# Patient Record
Sex: FEMALE | Employment: OTHER | URBAN - METROPOLITAN AREA
[De-identification: names, ages, dates, MRNs, and addresses within clinical notes are randomized per-mention and may not be internally consistent; named-entity substitution may affect disease eponyms.]

---

## 2017-08-22 ENCOUNTER — TRANSFERRED RECORDS (OUTPATIENT)
Dept: HEALTH INFORMATION MANAGEMENT | Facility: CLINIC | Age: 62
End: 2017-08-22

## 2024-06-06 ENCOUNTER — TRANSFERRED RECORDS (OUTPATIENT)
Dept: HEALTH INFORMATION MANAGEMENT | Facility: CLINIC | Age: 69
End: 2024-06-06

## 2024-08-15 ENCOUNTER — TRANSCRIBE ORDERS (OUTPATIENT)
Dept: OTHER | Age: 69
End: 2024-08-15

## 2024-08-15 DIAGNOSIS — R27.0 ATAXIA: Primary | ICD-10-CM

## 2024-10-16 ENCOUNTER — TRANSFERRED RECORDS (OUTPATIENT)
Dept: HEALTH INFORMATION MANAGEMENT | Facility: CLINIC | Age: 69
End: 2024-10-16

## 2024-10-23 NOTE — TELEPHONE ENCOUNTER
Action 10/23/24 MV 4.37pm   Action Taken Records and imaging request faxed to [facility]     Action 10/28/24 MV 11.01am   Action Taken 1) Recs received and sent to scanning   2) called [facility] to push over remaining CT images     UPDATE: images resolved         RECORDS RECEIVED FROM: external   REASON FOR VISIT: ataxia   PROVIDER: Dr. Hill   DATE OF APPT: 11/1/24   NOTES (FOR ALL VISITS) STATUS DETAILS   OFFICE NOTE from referring provider Received Dr Morris Fields @ [facility]:  6/6/24 9/18/17   MEDICATION LIST Received    IMAGING  (FOR ALL VISITS)     MRI (HEAD, NECK, SPINE) PACS [facility]:  MRI Head 7/9/24   CT (HEAD, NECK, SPINE) PACS [facility]:  CT Head 8/13/24  CT Head 7/3/24  CT Head 5/5/24  CT Head 4/30/24

## 2024-10-31 NOTE — PROGRESS NOTES
Department of Neurology  Movement Disorders Division  New Patient     Patient: Michelle Saldaña  MRN: [id number]   : 1955   Date of Visit: 10/31/2024  PCP: [facility]  Referring Provider:    CC: Gait Abnormality w/ recurrent falls + Tremor    History of Present Illness  Michelle Saldaña is a 69 year old ***-handed female with pertinent PMHx of carotid artery stenosis, HTN, hypothyroidism, sleep apnea, IBS, depression, bipolar affective disorder, and hx rheumatic fever that presents to Neurology Movement clinic with complaints of an abnormal gait, recurrent falls, and tremor. They are accompanied by ***    ***    Movement Disorder-related Medications                                                          Last taken at ***    Review of Systems:  Other than that mentioned above, the remainder of 12 systems reviewed were negative.    Past Medical History:   No past medical history on file.    Past Surgical History:   No past surgical history on file.    Family History:   No family history on file.    Social History:        Medications:  No current outpatient medications on file.     Allergies:   Not on File     Physical Exam:  There were no vitals taken for this visit.    General: Sitting comfortably in chair, NAD  Neuro:  Mental Status: Awake, alert, attentive, and oriented. Able to provide a detailed history and answer questions without issue. Speech is clear and fluid  Cranial Nerves: PERRL, conjugate gaze, EOMI w/o nystagmus, visual fields intact bilaterally, facial sensation intact, no facial asymmetry noted, hearing intact to conversation, no dysarthria, symmetric palate rise w/ midline uvula, tongue is midline, shoulder shrug 5/5 bilaterally  Motor: Normal bulk and tone. No abnormal movements. Strength is 5/5 in all extremities in both proximal and distal muscle groups  Sensory: Intact to light touch in all extremities. Neg Romberg  Coordination: FNF and HS intact  bilaterally  Reflexes: 2+ and symmetric in the bilateral biceps, brachioradialis, knees, and ankles. Down-going toes bilaterally, no clonus  Gait: Stance is narrow. Gait is smooth and balanced. No issue with turns    Data Reviewed: I have personally reviewed the tests/studies below.   MRI Brain w/o contrast (7/9/2024)  IMPRESSION:  No acute intracranial abnormality. No evidence of acute infarct, hemorrhage, or mass    Impression:    Michelle DomínguezWatson is a 69 year old female who presents with ***    Plan:   ***    Patient was discussed with the Movement Disorder attending, Dr Hill, who agrees with the above plan    Sandy Cabrera MD  Movement Disorder Fellow

## 2024-11-01 ENCOUNTER — PRE VISIT (OUTPATIENT)
Dept: NEUROLOGY | Facility: CLINIC | Age: 69
End: 2024-11-01

## 2024-11-01 ENCOUNTER — OFFICE VISIT (OUTPATIENT)
Dept: NEUROLOGY | Facility: CLINIC | Age: 69
End: 2024-11-01
Attending: PSYCHIATRY & NEUROLOGY
Payer: COMMERCIAL

## 2024-11-01 VITALS
OXYGEN SATURATION: 96 % | SYSTOLIC BLOOD PRESSURE: 162 MMHG | DIASTOLIC BLOOD PRESSURE: 92 MMHG | WEIGHT: 167 LBS | HEART RATE: 74 BPM

## 2024-11-01 DIAGNOSIS — M62.838 MUSCLE SPASM: Primary | ICD-10-CM

## 2024-11-01 DIAGNOSIS — R27.0 ATAXIA: Primary | ICD-10-CM

## 2024-11-01 PROCEDURE — 99207 PR NO CHARGE LOS: CPT | Performed by: GENETIC COUNSELOR, MS

## 2024-11-01 RX ORDER — LEVOTHYROXINE SODIUM 112 UG/1
112 TABLET ORAL
COMMUNITY
Start: 2024-10-23

## 2024-11-01 RX ORDER — LEVOTHYROXINE SODIUM 100 UG/1
100 TABLET ORAL DAILY
COMMUNITY

## 2024-11-01 RX ORDER — FLUTICASONE PROPIONATE 50 MCG
2 SPRAY, SUSPENSION (ML) NASAL DAILY
COMMUNITY
Start: 2024-10-16

## 2024-11-01 RX ORDER — BACLOFEN 10 MG/1
5 TABLET ORAL 3 TIMES DAILY
Qty: 60 TABLET | Refills: 3 | Status: SHIPPED | OUTPATIENT
Start: 2024-11-01 | End: 2024-11-15

## 2024-11-01 RX ORDER — BUPROPION HYDROCHLORIDE 300 MG/1
300 TABLET ORAL
COMMUNITY
Start: 2024-10-24

## 2024-11-01 RX ORDER — PROPRANOLOL HYDROCHLORIDE 10 MG/1
10 TABLET ORAL
COMMUNITY
Start: 2024-10-16

## 2024-11-01 RX ORDER — CETIRIZINE HYDROCHLORIDE 10 MG/1
10 TABLET ORAL
COMMUNITY
Start: 2024-10-16

## 2024-11-01 RX ORDER — LIDOCAINE 50 MG/G
PATCH TOPICAL
COMMUNITY
Start: 2024-10-16

## 2024-11-01 RX ORDER — LEVOTHYROXINE SODIUM 200 UG/1
200 TABLET ORAL
COMMUNITY

## 2024-11-01 ASSESSMENT — PATIENT HEALTH QUESTIONNAIRE - PHQ9: SUM OF ALL RESPONSES TO PHQ QUESTIONS 1-9: 12

## 2024-11-01 NOTE — PROGRESS NOTES
Department of Neurology  Movement Disorders Division  New Patient      Patient: Michelle Saldaña  MRN: [id number]   : 1955   Date of Visit: 10/31/2024  PCP: [facility]  Referring Provider:     CC: Gait Abnormality w/ recurrent falls + Tremor     History of Present Illness  Michelle Saldaña is a 69 year old gait imbalance who was referred to the ataxia clinic at the [facility].    She indicates that she had postural issues since young age perhaps at a 11 diagnosed as scoliosis.  She reports no pain or walking problems but she does recall that her back was twisted.  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  And has been on thyroid replacement since that age.  No family history of similar condition.    The current symptoms include difficulty walking and tendency to fall.  She is using a walker.  Her gait and posture is stooped however according to her  this has been ongoing for several years perhaps got worse recently.  She does really describe clonus with certain postures of the legs and in fact she cannot invoke clonus by extending her knees or twisting the ankles to certain positions.    No sensory symptoms no numbness or tingling.  No change in speech no swallowing issues or vision issues..    Past Medical History:   Past Medical History   Juvenile myxedema and scoliosis.        Past Surgical History:   Past Surgical History   No past surgical history on file.        Family History:   Family History   No family history on file.        Social History:               Medications:  Current Medications   No current outpatient medications on file.        Allergies:     Allergies   Not on File        Physical Exam:  BP (!) 162/92 (BP Location: Right arm, Patient Position: Sitting, Cuff Size: Adult Regular)   Pulse 74   Wt 75.8 kg (167 lb)   SpO2 96%          Neuro:  She was cognitively intact pleasant woman.  She  was accompanied by her  throughout history and physical examination.  Cranial nerves examination was unremarkable no nystagmus no palatal tremor.  Does have somewhat masked facies and stretching kneeing of the neck.  Upper and lower extremities showed slightly increased tone with clearly spasticity in the lower extremities as she was scheduled for.  Reflexes were brisk in the upper and lower extremities ankle reflexes were 1+ however.  Pinprick sensation was normally felt in the upper or lower extremities plantar responses were equivocal bilaterally.  Vibratory sensation was intact up to 10 seconds at the big toes bilaterally.    Gait was spastic.  Romberg sign was negative.  She could stand with the feet together and standing tandem but could not walk in tandem.  The gait is more spastic than ataxic.  Does have scoliosis convex to the right thoracolumbar.    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      Plan:  A lesion in the cervical or thoracic spinal cord do need to be ruled out with MRI scan.  Her brain MRI scan does show white matter changes but otherwise negative with a slight cerebellar atrophy.  I did explain to Ms. 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  It is not clear whether this condition has anything to do with juvenile myxedema.    For symptomatic treatment I have prescribed baclofen for her to be taken at a small dose of 5 mg 3 times daily.  She will let us know if this helps her walking.    She was referred to the [place] clinic by me and [facility] and I will try to see her at my clinic at the [facility].  We will send this note to the [facility].

## 2024-11-01 NOTE — LETTER
11/1/2024       RE: Michelle Saldaña  [address] [facility] [address]     Dear Colleague,    Thank you for referring your patient, Michelle Saldaña, to the [facility] NEUROLOGY CLINIC [place] at [facility]. Please see a copy of my visit note below.    GENETIC COUNSELING-Neurology  Michelle Kim was seen today for genetic consultation and neurologic exam. Michelle was accompanied to the appointment by her partner. I spent a total of 20 minutes with the patient with the majority of the time being spent on reviewing family history and discussing genetic testing options for Alzheimer disease.      MEDICAL HISTORY  Please see Dr. Hill's notes for a more thorough summary of medical history. Briefly, she reports a history of neurologic symptoms including incoordination and 'shaking.' Of uncertain etiology.     FAMILY HISTORY-see scanned pedigree  No clear family history of neurodegenerative is reported in the family history.       PLAN  Family history was reviewed with Dr. Hill. No genetic testing was pursued at today's visit.    Talha Suarez MS, [facility]  Certified Genetic Counselor  [facility] Ataxia [facility]    Again, thank you for allowing me to participate in the care of your patient.      Sincerely,    Dakota Suarez GC

## 2024-11-01 NOTE — LETTER
2024       RE: Michelle Saldaña  [address] [facility] [address]     Dear Colleague,    Thank you for referring your patient, Michelle Saldaña, to the [facility] NEUROLOGY CLINIC [place] at [facility]. Please see a copy of my visit note below.    Department of Neurology  Movement Disorders Division  New Patient      Patient: Michelle Saldaña  MRN: [id number]   : 1955   Date of Visit: 10/31/2024  PCP: [facility]  Referring Provider:     CC: Gait Abnormality w/ recurrent falls + Tremor     History of Present Illness  Michelle Saldaña is a 69 year old gait imbalance who was referred to the ataxia clinic at the [facility].    She indicates that she had postural issues since young age perhaps at a 11 diagnosed as scoliosis.  She reports no pain or walking problems but she does recall that her back was twisted.  She was also diagnosed as juvenile myxedema around the same age at [facility].  And has been on thyroid replacement since that age.  No family history of similar condition.    The current symptoms include difficulty walking and tendency to fall.  She is using a walker.  Her gait and posture is stooped however according to her  this has been ongoing for several years perhaps got worse recently.  She does really describe clonus with certain postures of the legs and in fact she cannot invoke clonus by extending her knees or twisting the ankles to certain positions.    No sensory symptoms no numbness or tingling.  No change in speech no swallowing issues or vision issues..    Past Medical History:   Past Medical History   Juvenile myxedema and scoliosis.        Past Surgical History:   Past Surgical History   No past surgical history on file.        Family History:   Family History   No family history on file.        Social History:                Medications:  Current Medications   No current outpatient medications on file.        Allergies:     Allergies   Not on File        Physical Exam:  BP (!) 162/92 (BP Location: Right arm, Patient Position: Sitting, Cuff Size: Adult Regular)   Pulse 74   Wt 75.8 kg (167 lb)   SpO2 96%          Neuro:  She was cognitively intact pleasant woman.  She was accompanied by her  throughout history and physical examination.  Cranial nerves examination was unremarkable no nystagmus no palatal tremor.  Does have somewhat masked facies and stretching kneeing of the neck.  Upper and lower extremities showed slightly increased tone with clearly spasticity in the lower extremities as she was scheduled for.  Reflexes were brisk in the upper and lower extremities ankle reflexes were 1+ however.  Pinprick sensation was normally felt in the upper or lower extremities plantar responses were equivocal bilaterally.  Vibratory sensation was intact up to 10 seconds at the big toes bilaterally.    Gait was spastic.  Romberg sign was negative.  She could stand with the feet together and standing tandem but could not walk in tandem.  The gait is more spastic than ataxic.  Does have scoliosis convex to the right thoracolumbar.    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      Plan:  A lesion in the cervical or thoracic spinal cord do need to be ruled out with MRI scan.  Her brain MRI scan does show white matter changes but otherwise negative with a slight cerebellar atrophy.  I did explain to Ms. 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  It is not clear whether this condition has anything to do with juvenile myxedema.    For symptomatic treatment I have prescribed baclofen for her to be taken at a small dose of 5 mg  3 times daily.  She will let us know if this helps her walking.    She was referred to the [place] clinic by me and [facility] and I will try to see her at my clinic at the [facility].  We will send this note to the [facility].      Again, thank you for allowing me to participate in the care of your patient.      Sincerely,    Thee Hill MD

## 2024-11-01 NOTE — PROGRESS NOTES
GENETIC COUNSELING-Neurology  Michelle iKm was seen today for genetic consultation and neurologic exam. Michelle was accompanied to the appointment by her partner. I spent a total of 20 minutes with the patient with the majority of the time being spent on reviewing family history and discussing genetic testing options for Alzheimer disease.      MEDICAL HISTORY  Please see Dr. Hill's notes for a more thorough summary of medical history. Briefly, she reports a history of neurologic symptoms including incoordination and 'shaking.' Of uncertain etiology.     FAMILY HISTORY-see scanned pedigree  No clear family history of neurodegenerative is reported in the family history.       PLAN  Family history was reviewed with Dr. Hill. No genetic testing was pursued at today's visit.    Talha Suarez MS, [facility]  Certified Genetic Counselor  [facility]     114

## 2024-11-15 RX ORDER — BACLOFEN 10 MG/1
5 TABLET ORAL 3 TIMES DAILY
Qty: 60 TABLET | Refills: 3 | Status: SHIPPED | OUTPATIENT
Start: 2024-11-15

## 2024-11-26 ENCOUNTER — TELEPHONE (OUTPATIENT)
Dept: NEUROLOGY | Facility: CLINIC | Age: 69
End: 2024-11-26

## 2025-01-17 ENCOUNTER — TELEPHONE (OUTPATIENT)
Dept: NEUROLOGY | Facility: CLINIC | Age: 70
End: 2025-01-17

## 2025-01-17 DIAGNOSIS — R25.2 SPASTICITY: Primary | ICD-10-CM

## 2025-01-17 NOTE — TELEPHONE ENCOUNTER
[facility] Call Center    Phone Message    May a detailed message be left on voicemail: no     Reason for Call: Other:   Magali, from [facility] calling in to check on the status of MRI that patient was supposed to have done. Please give Magali a call back to discuss at [phone]    Action Taken: Message routed to:  [facility] Neurology     Travel Screening: Not Applicable

## 2025-01-21 NOTE — TELEPHONE ENCOUNTER
[facility] Call Center    Phone Message    May a detailed message be left on voicemail: yes     Reason for Call: Other:   [facility]ali [facility] [place] [facility] calling in. States that they have reached out to the clinic over three times to get a response from Dr Hill in regards to an MRI, and all three times they have not heard back. Magali is reporting of frustration, as patient is not getting the care they need and clinic does not advise on the situation, or gives the [facility] any updates.     Magali requesting for a patient advocate for the patient. Writer gave number for them to contact patient relations. Magali is now requesting for anyone at the clinic that works with Dr Hill to give them a call in regards to care plan and next steps. Please give their office a call back at [phone]    Action Taken: Message routed to:  [facility] Neurology     Travel Screening: Not Applicable                   Ms. Saldaña needs cervical and thoracic spine MRI scans. I can only request these through the [facility]

## 2025-01-29 ENCOUNTER — DOCUMENTATION ONLY (OUTPATIENT)
Dept: NEUROLOGY | Facility: CLINIC | Age: 70
End: 2025-01-29

## 2025-01-29 NOTE — PROGRESS NOTES
Referral, demographics, office note was fax to [phone], fax number was given by radiology clinic, patient was called and is aware that fax was sent to the radiology clinic, message was written for radiology clinic to call patient to schedule appointment

## 2025-02-06 ENCOUNTER — DOCUMENTATION ONLY (OUTPATIENT)
Dept: NEUROLOGY | Facility: CLINIC | Age: 70
End: 2025-02-06

## 2025-02-06 NOTE — PROGRESS NOTES
Received MRI report from [facility]  Records Date of service: 2/5/25  Copy has been sent to scanning, and emailed to nurse

## 2025-05-15 ENCOUNTER — TRANSCRIBE ORDERS (OUTPATIENT)
Dept: OTHER | Age: 70
End: 2025-05-15

## 2025-05-15 DIAGNOSIS — R27.0 ATAXIA, UNSPECIFIED: Primary | ICD-10-CM

## 2025-05-19 ENCOUNTER — PATIENT OUTREACH (OUTPATIENT)
Dept: CARE COORDINATION | Facility: CLINIC | Age: 70
End: 2025-05-19

## 2025-05-19 ENCOUNTER — TELEPHONE (OUTPATIENT)
Dept: NEUROLOGY | Facility: CLINIC | Age: 70
End: 2025-05-19

## 2025-05-19 NOTE — TELEPHONE ENCOUNTER
Patient confirmed scheduled appointment:  Date: 11/21/25  Time: 11:30AM  Visit type: RETURN SUBSPECIALTY ATAXIA  Provider: JESIKA  Location: [facility]  Testing/imaging: N/A  Additional notes: N/A

## 2025-06-10 NOTE — TELEPHONE ENCOUNTER
CAlled Gena back on phone number given. No answer, left message that this writer had spoken to Genaro in [facility] at the [facility] [place] in regards to having the MRI done for the pt.  
CAlled Gena from the [facility] clinic of the [facility] [place], phone [phone] to discuss pt having MRI done. She said pt's visit with Dr. Hill is covered under [facility] along with any blood tests or MRI's that Dr. Hill wants done. She said he needs to order the MRI and pt could have this done at [facility] [place]. She gave this writer the name of the nurse that works with Dr. Fields at the [facility] [place] who referred the pt to Dr. Hill.  Genaro is the nurse, call [facility] at [phone] ext. [phone]. This writer called Genaro. He said Dr. Hill needs to fill out a Request for Services form and then Dr. Fields can get that form and order the MRI. He said they did receive Dr. Hill's notes from her clinic visit at the [facility]ia [facility]. He said it is next to impossible for a pt to see a specialist ourside of their living area unless they turn all their care over to the VA facility at the outside area. He will try and get this scan done and will call this writer back after the radiology report is back.   
[facility] Call Center    Phone Message    May a detailed message be left on voicemail: yes     Reason for Call: Other:       Gena, [facility] the [facility] [place], calling clinic to see if any more clarification was needed for how to order the MRI through community care.   She states she is just following up from the call earlier and if there are any questions or anything else needed to call her back at #[phone]      Action Taken: Message routed to:  [facility] Neurology    Travel Screening: Not Applicable                                                                     
[facility] Call Center    Phone Message    May a detailed message be left on voicemail: yes     Reason for Call: Order(s): Other:   Reason for requested: MR Cervical/Thoracic  Date needed: asap, for scheduling  Provider name: Dr. Sergio Avery calling from the [facility] to request this order be entered so Michelle may schedule. Gena would like to have a call to her and Michelle to inform them that this order has been entered.    Action Taken: Message routed to:  [facility]    Travel Screening: Not Applicable     Date of Service:                                                                     
Detail Level: Detailed
Quality 226: Preventive Care And Screening: Tobacco Use: Screening And Cessation Intervention: Patient screened for tobacco use and is an ex/non-smoker
Quality 130: Documentation Of Current Medications In The Medical Record: Current Medications Documented